# Patient Record
Sex: FEMALE | Race: BLACK OR AFRICAN AMERICAN | NOT HISPANIC OR LATINO | Employment: FULL TIME | URBAN - METROPOLITAN AREA
[De-identification: names, ages, dates, MRNs, and addresses within clinical notes are randomized per-mention and may not be internally consistent; named-entity substitution may affect disease eponyms.]

---

## 2018-04-03 ENCOUNTER — OFFICE VISIT (OUTPATIENT)
Dept: OPHTHALMOLOGY | Facility: CLINIC | Age: 63
End: 2018-04-03
Payer: COMMERCIAL

## 2018-04-03 DIAGNOSIS — Z13.5 SCREENING FOR GLAUCOMA: ICD-10-CM

## 2018-04-03 DIAGNOSIS — H52.4 ASTIGMATISM WITH PRESBYOPIA, BILATERAL: ICD-10-CM

## 2018-04-03 DIAGNOSIS — H35.033 HYPERTENSIVE RETINOPATHY OF BOTH EYES, GRADE 1: Primary | ICD-10-CM

## 2018-04-03 DIAGNOSIS — H52.203 ASTIGMATISM WITH PRESBYOPIA, BILATERAL: ICD-10-CM

## 2018-04-03 DIAGNOSIS — I10 ESSENTIAL HYPERTENSION: ICD-10-CM

## 2018-04-03 PROCEDURE — 99999 PR PBB SHADOW E&M-EST. PATIENT-LVL I: CPT | Mod: PBBFAC,,, | Performed by: OPTOMETRIST

## 2018-04-03 PROCEDURE — 92014 COMPRE OPH EXAM EST PT 1/>: CPT | Mod: S$GLB,,, | Performed by: OPTOMETRIST

## 2018-04-03 PROCEDURE — 92015 DETERMINE REFRACTIVE STATE: CPT | Mod: S$GLB,,, | Performed by: OPTOMETRIST

## 2018-04-03 NOTE — PROGRESS NOTES
HPI     Eye Exam    Additional comments: Yearly           Comments   Last seen by [facility] on 12/12/16 for yearly eye exam. Patient here today for   yearly eye exam. Patient states since last visit her vision has become   more blurred. Patient states both eyes tear constantly associated with FB   sensation.   1. NSC OU  2. Dry Eyes  HPI    Any vision changes since last exam: Yes  Eye pain: No  Other ocular symptoms: Tearing and FB sensation (uses Clear eyes PRN)    Do you wear currently wear glasses or contacts? Glasses    Interested in contacts today? No    Do you plan on getting new glasses today? If needed         Last edited by Dayanna Benjamin, PCT on 4/3/2018 10:29 AM. (History)            Assessment /Plan     For exam results, see Encounter Report.    Hypertensive retinopathy of both eyes, grade 1    Essential hypertension    Screening for glaucoma    Astigmatism with presbyopia, bilateral      Grade 1 hypertensive retinopathy changes in caliber of the vessels and nicking is apparent.  Glaucoma screening and fundus exam normal.  Updated glasses prescription.  Return to clinic 1 yr.

## 2018-04-09 ENCOUNTER — OFFICE VISIT (OUTPATIENT)
Dept: OTOLARYNGOLOGY | Facility: CLINIC | Age: 63
End: 2018-04-09
Payer: COMMERCIAL

## 2018-04-09 ENCOUNTER — CLINICAL SUPPORT (OUTPATIENT)
Dept: AUDIOLOGY | Facility: CLINIC | Age: 63
End: 2018-04-09
Payer: COMMERCIAL

## 2018-04-09 VITALS
SYSTOLIC BLOOD PRESSURE: 162 MMHG | TEMPERATURE: 98 F | WEIGHT: 175.94 LBS | BODY MASS INDEX: 33.22 KG/M2 | HEART RATE: 73 BPM | HEIGHT: 61 IN | DIASTOLIC BLOOD PRESSURE: 93 MMHG

## 2018-04-09 DIAGNOSIS — L29.9 ITCHING OF EAR: Primary | ICD-10-CM

## 2018-04-09 DIAGNOSIS — H92.03 OTALGIA OF BOTH EARS: Primary | ICD-10-CM

## 2018-04-09 PROCEDURE — 99999 PR PBB SHADOW E&M-EST. PATIENT-LVL III: CPT | Mod: PBBFAC,,, | Performed by: OTOLARYNGOLOGY

## 2018-04-09 PROCEDURE — 99243 OFF/OP CNSLTJ NEW/EST LOW 30: CPT | Mod: S$GLB,,, | Performed by: OTOLARYNGOLOGY

## 2018-04-09 PROCEDURE — 92567 TYMPANOMETRY: CPT | Mod: S$GLB,,, | Performed by: AUDIOLOGIST

## 2018-04-09 PROCEDURE — 92557 COMPREHENSIVE HEARING TEST: CPT | Mod: S$GLB,,, | Performed by: AUDIOLOGIST

## 2018-04-09 RX ORDER — CLOBETASOL PROPIONATE 0.46 MG/ML
SOLUTION TOPICAL 2 TIMES DAILY
Qty: 25 ML | Refills: 3 | Status: SHIPPED | OUTPATIENT
Start: 2018-04-09 | End: 2018-04-19

## 2018-04-09 NOTE — PROGRESS NOTES
Delmer Guardado was seen 04/09/2018 for an audiological evaluation.  Patient complains of itching ears for the past year.  She denies hearing loss, but would like a baseline audiogram today.  She reports occasional bilateral tinnitus.    Results reveal essentiallynormal hearing sensitivity 250-8000 Hz bilaterally.  Speech Reception Thresholds were  15 dBHL for the right ear and 15 dBHL for the left ear.   Word recognition scores were excellent bilaterally.  Tympanograms were Type A for the right ear and Type A for the left ear.    Patient was counseled on the above findings.    REC:  Annual audiogram

## 2018-04-09 NOTE — LETTER
April 23, 2018      Marisol Martin, OD  [address]  [place] [address]           [facility]  [address] [place] [address]  Phone: [phone]  Fax: [phone]          Patient: Delmer Guardado   MR Number: [id number]   YOB: 1955   Date of Visit: 4/9/2018       Dear Dr. Marisol Martin:    Thank you for referring Delmer Guardado to me for evaluation. Attached you will find relevant portions of my assessment and plan of care.    If you have questions, please do not hesitate to call me. I look forward to following Delmer Guardado along with you.    Sincerely,    Baldo Yates MD    Enclosure  CC:  No Recipients    If you would like to receive this communication electronically, please contact [email or web address] or [phone] to request more information on "[employer]" Link access.    For providers and/or their staff who would like to refer a patient to [facility], please contact us through our one-stop-shop provider referral line, [facility] , at [phone].    If you feel you have received this communication in error or would no longer like to receive these types of communications, please e-mail [email or web address]

## 2018-04-23 NOTE — PROGRESS NOTES
"Referring Provider:    Marisol Martin, Od  [address] [place] [address]  Subjective:   Patient: Delmer Guardado [id number], :1955   Visit date:2018 10:50 AM    Chief Complaint:  ear itches (bilateral, left more than right, for yrs)    HPI:  Delmer is a 63 y.o. female who I was asked to see in consultation for evaluation of the following issue(s):     Right ear itching.  Years.  No clear exacerbating or relieving factors.  No drainage or hearing loss.     Review of Systems:  -     Allergic/Immunologic: has No Known Allergies..  -     Constitutional: Current temp: 97.5 °F (36.4 °C) (Tympanic)      Her meds, allergies, medical, surgical, social & family histories were reviewed & updated:  -     She has a current medication list which includes the following prescription(s): olmesartan/amlodipin/hcthiazid, paroxetine, and clobetasol.  -     She  has a past medical history of Hypertension.   -     She  does not have any pertinent problems on file.   -     She  has a past surgical history that includes Shoulder surgery.  -     She  reports that she has been smoking.  She does not have any smokeless tobacco history on file. She reports that she drinks alcohol. She reports that she does not use drugs.  -     Her family history includes Cancer in her sister; Diabetes in her father; Stroke in her father.  -     She has No Known Allergies.    Objective:     Physical Exam:  Vitals:  BP (!) 162/93   Pulse 73   Temp 97.5 °F (36.4 °C) (Tympanic)   Ht 5' 1" (1.549 m)   Wt 79.8 kg (175 lb 14.8 oz)   BMI 33.24 kg/m²   General appearance:  Well developed, well nourished    Eyes:  Extraocular motions intact, PERRL    Communication:  no hoarseness, no dysphonia    Ears:  Otoscopy of external auditory canals and tympanic membranes was normal, clinical speech reception thresholds grossly intact, no mass/lesion of auricle. Somewhat dry, flaky skin of right EAC  Nose:  No masses/lesions of external nose, nasal " mucosa, septum, and turbinates were within normal limits.  Mouth:  No mass/lesion of lips, teeth, gums, hard/soft palate, tongue, tonsils, or oropharynx.    Cardiovascular:  No pedal edema; Radial Pulses +2     Neck & Lymphatics:  No cervical lymphadenopathy, no neck mass/crepitus/ asymmetry, trachea is midline, no thyroid enlargement/tenderness/mass.    Psych: Oriented x3,  Alert with normal mood and affect.     Respiration/Chest:  Symmetric expansion during respiration, normal respiratory effort.    Skin:  Warm and intact. No ulcerations of face, scalp, neck.      Assessment & Plan:   Itching of ear    Other orders  -     clobetasol (TEMOVATE) 0.05 % external solution; Apply topically 2 (two) times daily.  Dispense: 25 mL; Refill: 3        We discussed her medical conditions, treatments and plan.  Delmer should return to clinic if any issues arise (symptoms worsen or persist), otherwise we will see her back in the clinic only as needed.      Thank you for allowing me to participate in the care of Delmer.    Baldo Yates MD

## 2018-09-11 ENCOUNTER — HISTORICAL (OUTPATIENT)
Dept: ADMINISTRATIVE | Facility: HOSPITAL | Age: 63
End: 2018-09-11

## 2019-04-08 ENCOUNTER — OFFICE VISIT (OUTPATIENT)
Dept: OPHTHALMOLOGY | Facility: CLINIC | Age: 64
End: 2019-04-08
Payer: COMMERCIAL

## 2019-04-08 DIAGNOSIS — Z13.5 SCREENING FOR GLAUCOMA: ICD-10-CM

## 2019-04-08 DIAGNOSIS — H52.203 ASTIGMATISM WITH PRESBYOPIA, BILATERAL: ICD-10-CM

## 2019-04-08 DIAGNOSIS — H52.4 ASTIGMATISM WITH PRESBYOPIA, BILATERAL: ICD-10-CM

## 2019-04-08 DIAGNOSIS — H25.13 NUCLEAR SCLEROSIS OF BOTH EYES: ICD-10-CM

## 2019-04-08 DIAGNOSIS — H35.033 HYPERTENSIVE RETINOPATHY OF BOTH EYES, GRADE 1: Primary | ICD-10-CM

## 2019-04-08 PROCEDURE — 92015 DETERMINE REFRACTIVE STATE: CPT | Mod: S$GLB,,, | Performed by: OPTOMETRIST

## 2019-04-08 PROCEDURE — 92015 PR REFRACTION: ICD-10-PCS | Mod: S$GLB,,, | Performed by: OPTOMETRIST

## 2019-04-08 PROCEDURE — 99999 PR PBB SHADOW E&M-EST. PATIENT-LVL II: ICD-10-PCS | Mod: PBBFAC,,, | Performed by: OPTOMETRIST

## 2019-04-08 PROCEDURE — 92014 PR EYE EXAM, EST PATIENT,COMPREHESV: ICD-10-PCS | Mod: S$GLB,,, | Performed by: OPTOMETRIST

## 2019-04-08 PROCEDURE — 99999 PR PBB SHADOW E&M-EST. PATIENT-LVL II: CPT | Mod: PBBFAC,,, | Performed by: OPTOMETRIST

## 2019-04-08 PROCEDURE — 92014 COMPRE OPH EXAM EST PT 1/>: CPT | Mod: S$GLB,,, | Performed by: OPTOMETRIST

## 2019-04-08 RX ORDER — SERTRALINE HYDROCHLORIDE 25 MG/1
TABLET, FILM COATED ORAL
Refills: 0 | COMMUNITY
Start: 2019-03-27 | End: 2023-09-25

## 2019-04-08 NOTE — PROGRESS NOTES
HPI     Hypertensive Eye Exam      Additional comments: Yearly              Comments     Last seen by [facility] on 4/3/18 for yearly eye exam  Patient here today for yearly eye exam  Has noticeable changes in vision since last eye exam  Wears Bifocal glasses full-time updated 1 year ago  Patient states vision has been blurred at distance and near for a few   months  States she has anxiety and when she gets nervous her vision is blurred   unsure if medication is causing that (Sertraline 25mg)  No other complaints  Uses lubricating drops OTC for dryness    1. Hypertensive retinopathy OU grade 1  2. NSC OU  3. Dry Eyes OU          Last edited by Dayanna Benjamin, PCT on 4/8/2019 10:07 AM. (History)            Assessment /Plan     For exam results, see Encounter Report.    Hypertensive retinopathy of both eyes, grade 1    Screening for glaucoma    Nuclear sclerosis of both eyes    Astigmatism with presbyopia, bilateral      Glaucoma screening negative.  Early hypertensive retinopathy both eyes.  Nuclear sclerosis may affect vision in low light.  Updated glasses prescription.  Return to clinic 1 yr.

## 2022-04-07 ENCOUNTER — HISTORICAL (OUTPATIENT)
Dept: ADMINISTRATIVE | Facility: HOSPITAL | Age: 67
End: 2022-04-07
Payer: COMMERCIAL

## 2022-04-23 VITALS
HEIGHT: 61 IN | WEIGHT: 163 LBS | BODY MASS INDEX: 30.78 KG/M2 | SYSTOLIC BLOOD PRESSURE: 149 MMHG | DIASTOLIC BLOOD PRESSURE: 54 MMHG

## 2022-05-02 NOTE — HISTORICAL [facility]
This is a historical note converted from Nikole. Formatting and pictures may have been removed.  Please reference Nikole for original formatting and attached multimedia. Chief Complaint  PT. REPORTS THAT SHE HAS BEEN HAVING RIGHT KNEE PAIN SINCE 6/2018. PT. DID MRI IN [place]. CD BROUGHT IN TODAY. PAIN TODAY 5/10....SB  History of Present Illness  This is a 63-year-old female comes in today with continued?right knee pain she has had since this summer.? She states this past summer when she was?cleaning out her house that she slipped?and then felt a pop in her knee and had a lot of pain since that point time.? She initially?had a lot of pain and swelling?and has had continued symptoms in the posterior medial aspect of the knee since that incident. ?She has an MRI that shows a?large medial meniscus posterior?horn tear.  Review of Systems  GENERAL: No fevers, chills, unexpected weight loss or gain  MUSCULOSKELETAL: Joint pain, extremity pain  Physical Exam  Vitals & Measurements  HR:?78(Peripheral)? BP:?149/54?  HT:?156?cm? HT:?156?cm? WT:?73.93?kg? WT:?73.93?kg? BMI:?30.38?  General: Well-developed, well-nourished.  Neuro: Alert and oriented x 3.  Psych: Normal mood and affect.  CV: Palpable radial pulses.  Resp: Smooth and unlabored.  Skin: No evidence of focal lesions or trauma.  Hem/Imm/Lymph: No evidence of lymphangitis or adenopathy.  Gait: No trendelenburg gait.  DTR: 2+, no hypo or hyperreflexia.  Coordination and Balance: No tremors or abnormal station.  Knee Exam:  No obvious deformity. Range of motion from  degrees. Negative patella grind and equal subluxation of knee cap medial and lateral < 1cm. Negative patella tendon tenderness. Negative Lachman and anterior drawer test. Negative posterior drawer test. Negative varus and valgus stress test.? Positive medial joint line tenderness.? Positive McMurrays medial sided knee pain.? Positive deep knee flexion been test with medial sided knee pain.? Negative  lateral joint line tenderness. 4-/5 strength and normal skin appearance. Sensibility normal.  Assessment/Plan  1.?Tear of medial meniscus of right knee  ? We discussed multiple options we will start conservatively. ?She will get a steroid injection today. ?I told her that she may indeed undergo surgery?in the future.  Knee injection:  The lateral parapatellar site was prepped and draped in normal sterile fashion. A 25-gauge needle was inserted into that space.? A solution with 1cc of 40mg depomedrol and 2cc of 0.5% Marcaine was injected into the knee. Patient tolerated procedure well without any complications.  Ordered:  methylPREDNISolone, 40 mg, Intra-Articular, Once, first dose 09/11/18 13:25:00 CDT, stop date 09/11/18 13:25:00 CDT, NOW  asp/inj jnt/bursa, major [id number] PC, 09/11/18 13:25:00 CDT, [facility] - Ortho Surg , Routine, 09/11/18 13:25:00 CDT  Office/Outpatient Visit Level 3 [facility] [id number] PC, Tear of medial meniscus of right knee, [facility] - Ortho Surg , 09/11/18 13:25:00 CDT  ?   Problem List/Past Medical History  Ongoing  Obesity  Historical  No qualifying data  Medications  amlodipine/hydrochlorothiazide/olmesartan 5 mg-25 mg-40 mg oral tablet, 1 tab(s), Oral, Daily  Depo-Medrol 40 mg/mL injectable suspension, 40 mg, Intra-Articular, Once  GABAPENTIN 300MG CAPSULES, 300 mg= 1 cap(s), Oral, TID  NAPROXEN 500MG TABLETS, 500 mg= 1 tab(s), Oral, BID  OLMESARTAN/AMLOD/HCTZ 40-5-25MG T, 1 tab(s), Oral, Daily  POTASSIUM CL 20MEQ ER TABLETS, 20 mEq= 1 tab(s), Oral, BID  PREDNISONE 20MG TABLETS, 20 mg= 1 tab(s), Oral, BID  TRAMADOL 50MG TABLETS  TRINTELLIX 10MG TB (WAS BRINTELLIX), 10 mg= 1 tab(s), Oral, Daily  Allergies  No Known Medication Allergies  Social History  Alcohol  Current, Beer, 09/11/2018  Substance Abuse  Never, 09/11/2018  Tobacco  Current every day smoker Use:., 09/11/2018  Health Maintenance  Health Maintenance  ???Pending?(in the next year)  ??? ??Due?  ??? ? ? ?ADL Screening due??09/11/18??and  every 1??year(s)  ??? ? ? ?Alcohol Misuse Screening due??09/11/18??and every 1??year(s)  ??? ? ? ?Aspirin Therapy for CVD Prevention due??09/11/18??and every 1??year(s)  ??? ? ? ?Smoking Cessation due??09/11/18??Variable frequency  ??? ? ? ?Tetanus Vaccine due??09/11/18??and every 10??year(s)  ??? ? ? ?Zoster Vaccine due??09/11/18??and every 100??year(s)  ???Satisfied?(in the past 1 year)  ??? ??Satisfied?  ??? ? ? ?Blood Pressure Screening on??09/11/18.??Satisfied by Natalie Guzman.  ??? ? ? ?Body Mass Index Check on??09/11/18.??Satisfied by Natalie Gzuman E.  ??? ? ? ?Depression Screening on??09/11/18.??Satisfied by Natalie Guzman E.  ??? ? ? ?Obesity Screening on??09/11/18.??Satisfied by Natalie Guzman E.  ?  ?

## 2023-07-21 ENCOUNTER — HOSPITAL ENCOUNTER (OUTPATIENT)
Dept: RADIOLOGY | Facility: CLINIC | Age: 68
Discharge: HOME | End: 2023-07-21
Attending: ORTHOPAEDIC SURGERY
Payer: MEDICARE

## 2023-07-21 ENCOUNTER — OFFICE VISIT (OUTPATIENT)
Dept: ORTHOPEDICS | Facility: CLINIC | Age: 68
End: 2023-07-21
Payer: MEDICARE

## 2023-07-21 ENCOUNTER — PATIENT MESSAGE (OUTPATIENT)
Dept: ORTHOPEDICS | Facility: CLINIC | Age: 68
End: 2023-07-21

## 2023-07-21 VITALS — BODY MASS INDEX: 30.74 KG/M2 | WEIGHT: 162.81 LBS | HEIGHT: 61 IN

## 2023-07-21 DIAGNOSIS — M54.9 BACK PAIN, UNSPECIFIED BACK LOCATION, UNSPECIFIED BACK PAIN LATERALITY, UNSPECIFIED CHRONICITY: ICD-10-CM

## 2023-07-21 DIAGNOSIS — M54.2 NECK PAIN: ICD-10-CM

## 2023-07-21 DIAGNOSIS — M54.12 CERVICAL RADICULOPATHY: Primary | ICD-10-CM

## 2023-07-21 PROCEDURE — 99204 PR OFFICE/OUTPT VISIT, NEW, LEVL IV, 45-59 MIN: ICD-10-PCS | Mod: ,,, | Performed by: ORTHOPAEDIC SURGERY

## 2023-07-21 PROCEDURE — 1101F PT FALLS ASSESS-DOCD LE1/YR: CPT | Mod: CPTII,,, | Performed by: ORTHOPAEDIC SURGERY

## 2023-07-21 PROCEDURE — 1160F PR REVIEW ALL MEDS BY PRESCRIBER/CLIN PHARMACIST DOCUMENTED: ICD-10-PCS | Mod: CPTII,,, | Performed by: ORTHOPAEDIC SURGERY

## 2023-07-21 PROCEDURE — 3288F PR FALLS RISK ASSESSMENT DOCUMENTED: ICD-10-PCS | Mod: CPTII,,, | Performed by: ORTHOPAEDIC SURGERY

## 2023-07-21 PROCEDURE — 1159F PR MEDICATION LIST DOCUMENTED IN MEDICAL RECORD: ICD-10-PCS | Mod: CPTII,,, | Performed by: ORTHOPAEDIC SURGERY

## 2023-07-21 PROCEDURE — 72100 XR LUMBAR SPINE AP AND LATERAL: ICD-10-PCS | Mod: ,,, | Performed by: ORTHOPAEDIC SURGERY

## 2023-07-21 PROCEDURE — 99204 OFFICE O/P NEW MOD 45 MIN: CPT | Mod: ,,, | Performed by: ORTHOPAEDIC SURGERY

## 2023-07-21 PROCEDURE — 1159F MED LIST DOCD IN RCRD: CPT | Mod: CPTII,,, | Performed by: ORTHOPAEDIC SURGERY

## 2023-07-21 PROCEDURE — 4010F ACE/ARB THERAPY RXD/TAKEN: CPT | Mod: CPTII,,, | Performed by: ORTHOPAEDIC SURGERY

## 2023-07-21 PROCEDURE — 72040 XR CERVICAL SPINE AP LATERAL: ICD-10-PCS | Mod: ,,, | Performed by: ORTHOPAEDIC SURGERY

## 2023-07-21 PROCEDURE — 1125F PR PAIN SEVERITY QUANTIFIED, PAIN PRESENT: ICD-10-PCS | Mod: CPTII,,, | Performed by: ORTHOPAEDIC SURGERY

## 2023-07-21 PROCEDURE — 3008F BODY MASS INDEX DOCD: CPT | Mod: CPTII,,, | Performed by: ORTHOPAEDIC SURGERY

## 2023-07-21 PROCEDURE — 1125F AMNT PAIN NOTED PAIN PRSNT: CPT | Mod: CPTII,,, | Performed by: ORTHOPAEDIC SURGERY

## 2023-07-21 PROCEDURE — 1160F RVW MEDS BY RX/DR IN RCRD: CPT | Mod: CPTII,,, | Performed by: ORTHOPAEDIC SURGERY

## 2023-07-21 PROCEDURE — 3008F PR BODY MASS INDEX (BMI) DOCUMENTED: ICD-10-PCS | Mod: CPTII,,, | Performed by: ORTHOPAEDIC SURGERY

## 2023-07-21 PROCEDURE — 72040 X-RAY EXAM NECK SPINE 2-3 VW: CPT | Mod: ,,, | Performed by: ORTHOPAEDIC SURGERY

## 2023-07-21 PROCEDURE — 1101F PR PT FALLS ASSESS DOC 0-1 FALLS W/OUT INJ PAST YR: ICD-10-PCS | Mod: CPTII,,, | Performed by: ORTHOPAEDIC SURGERY

## 2023-07-21 PROCEDURE — 72100 X-RAY EXAM L-S SPINE 2/3 VWS: CPT | Mod: ,,, | Performed by: ORTHOPAEDIC SURGERY

## 2023-07-21 PROCEDURE — 4010F PR ACE/ARB THEARPY RXD/TAKEN: ICD-10-PCS | Mod: CPTII,,, | Performed by: ORTHOPAEDIC SURGERY

## 2023-07-21 PROCEDURE — 3288F FALL RISK ASSESSMENT DOCD: CPT | Mod: CPTII,,, | Performed by: ORTHOPAEDIC SURGERY

## 2023-07-21 RX ORDER — HYDROCHLOROTHIAZIDE 25 MG/1
25 TABLET ORAL DAILY
COMMUNITY

## 2023-07-21 RX ORDER — ALPRAZOLAM 0.5 MG/1
0.5 TABLET ORAL DAILY PRN
COMMUNITY
Start: 2023-06-28

## 2023-07-21 RX ORDER — ROSUVASTATIN CALCIUM 10 MG/1
10 TABLET, COATED ORAL NIGHTLY
COMMUNITY
Start: 2023-06-19

## 2023-07-21 RX ORDER — AMLODIPINE BESYLATE 5 MG/1
5 TABLET ORAL
COMMUNITY
Start: 2023-04-17

## 2023-07-21 RX ORDER — OLMESARTAN MEDOXOMIL 40 MG/1
40 TABLET ORAL
COMMUNITY
Start: 2023-04-17 | End: 2023-09-25

## 2023-07-21 NOTE — PROGRESS NOTES
[facility]  Orthopedic Clinic Note      Chief Complaint:   Chief Complaint   Patient presents with    Pain     upper back and neck pain. states it has been ongoing for a few years. states it has been constant. at this time states its not bad  states burning and at times it will radiate down. ibu if needed. when she turns to the sides states she hears popping. hx of a fall in 2015     Referring Physician: No ref. provider found      History of Present Illness:    This is a 68 y.o. year old female presenting with complaints of neck and low back pain ongoing for several years.  She reports mild midline neck/upper back pain, but her main complaint is radicular symptoms in the bilateral upper extremities, left worse than right.  There is burning pain in the bilateral trapezial area with symptoms that extend into the proximal upper extremities.  The symptoms in the left upper extremity actually radiate into the forearm and hand.  There is associated numbness and tingling in the left upper extremity and some perceived weakness.  She has been taking over the counter Tylenol and anti-inflammatories with no improvement in her symptoms  She also complains of low back pain ongoing for several years.  Pain is located in the midline lower back with associated radicular symptoms in the left lower extremity only.  She reports pain radiating into the buttock and down the left lower extremity.  She denies any significant weakness in the left lower extremity.  Taking over the counter Tylenol and anti-inflammatories with no improvement in her symptoms.      Past Medical History:   Diagnosis Date    Hypertension        Past Surgical History:   Procedure Laterality Date    SHOULDER SURGERY         Current Outpatient Medications   Medication Sig    ALPRAZolam (XANAX) 0.5 MG tablet Take 0.5 mg by mouth daily as needed.    amLODIPine (NORVASC) 5 MG tablet Take 5 mg by mouth.    hydroCHLOROthiazide (HYDRODIURIL) 25 MG tablet Take  "25 mg by mouth once daily.    olmesartan (BENICAR) 40 MG tablet Take 40 mg by mouth.    OLMESARTAN/AMLODIPIN/HCTHIAZID (TRIBENZOR ORAL) Take by mouth.    paroxetine (PAXIL) 40 MG tablet Take 40 mg by mouth every morning.    rosuvastatin (CRESTOR) 10 MG tablet Take 10 mg by mouth every evening.    sertraline (ZOLOFT) 25 MG tablet TK 1  PO Q MORNING    clobetasol (TEMOVATE) 0.05 % external solution Apply topically 2 (two) times daily.     No current facility-administered medications for this visit.       Review of patient's allergies indicates:  No Known Allergies    Family History   Problem Relation Age of Onset    Diabetes Father     Stroke Father     Cancer Sister         Ear cancer       Social History     Socioeconomic History    Marital status:    Tobacco Use    Smoking status: Heavy Smoker   Substance and Sexual Activity    Alcohol use: Yes    Drug use: No    Sexual activity: Yes     Partners: Male     Birth control/protection: Post-menopausal           Review of Systems:  All review of systems negative except for those stated in the HPI.    Examination:    Vital Signs:    Vitals:    07/21/23 1007   Weight: 73.8 kg (162 lb 12.8 oz)   Height: 5' 1" (1.549 m)   PainSc:   7       Body mass index is 30.76 kg/m².    Physical Examination:  General: Well-developed, well-nourished.  Neuro: Alert and oriented x 3.  Psych: Normal mood and affect.  Card: Regular rate and rhythm  Resp: Respirations regular and unlabored  Neck Exam:  No deformity. No tenderness to palpation along the spine. No step off. 5/5 strength RUE. 4/5 strength LUE.  Positive Spurling maneuver. Normal skin appearance. Sensibility normal.  Lumbar Exam:  No deformity. Negative tenderness to palpation along the spine. No step off. 5/5 strength RLE. 5/5 strength LLE.  Positive straight leg raise. Normal skin appearance. Sensibility normal.      Imaging: X-rays ordered and images interpreted today personally by me of 2 views of the cervical spine " demonstrate diffuse degenerative changes.  X-rays ordered and images interpreted today personally by me of 2 views of the lumbar spine demonstrate diffuse degenerative changes.    Assessment: Cervical radiculopathy  -     MRI Cervical Spine Without Contrast; Future; Expected date: 07/21/2023    Neck pain  -     X-Ray Cervical Spine AP And Lateral; Future; Expected date: 07/21/2023    Back pain, unspecified back location, unspecified back pain laterality, unspecified chronicity  -     X-Ray Lumbar Spine AP And Lateral; Future; Expected date: 07/21/2023        Plan:  X-rays were reviewed with the patient.  Given the chronic nature of her symptoms and failure to respond to conservative treatments, order entered for MRI of the cervical spine to assess for possible stenosis.  Plan to refer her for formal physical therapy when she finds a clinic close to home.  Continue over-the-counter medications as needed for pain.  She will return to clinic for review of MRI results once imaging is obtained.  She verbalized understanding of the plan of care with no further questions.    Yao Guardado MD personally performed the services described in this documentation, including but not limited to patient's history, physical examination, and assessment and plan of care. All medical record entries made by SHANIQUA Armas were performed at his direction and in his presence. The medical record was reviewed and is accurate and complete.         Follow up for Review of cervical spine MRI results.      DISCLAIMER: This note may have been dictated using voice recognition software and may contain grammatical errors.     NOTE: Consult report sent to referring provider via [email or web address].

## 2023-09-20 ENCOUNTER — TELEPHONE (OUTPATIENT)
Dept: NEUROSURGERY | Facility: CLINIC | Age: 68
End: 2023-09-20
Payer: MEDICARE

## 2023-09-20 DIAGNOSIS — M47.22 CERVICAL SPONDYLOSIS WITH RADICULOPATHY: Primary | ICD-10-CM

## 2023-09-20 NOTE — TELEPHONE ENCOUNTER
I scheduled patient accordingly per Katherine's recommendation with XR prior at [facility].

## 2023-09-20 NOTE — TELEPHONE ENCOUNTER
Dr. Guardado contacted Dr. Guerra about seeing his mother for her cervical spine.  I spoke to her yesterday late afternoon and this morning.  Please put her on Dr. Guerra scheduled for Monday at 2:30.  She is aware she will have to go get x-rays prior to that appointment.  I requested she arrived at [facility] for 1:30.  Please schedule the x-rays for that time.  I will put the order in now.  You do not have the collar.  She is aware.

## 2023-09-25 ENCOUNTER — HOSPITAL ENCOUNTER (OUTPATIENT)
Dept: RADIOLOGY | Facility: HOSPITAL | Age: 68
Discharge: HOME | End: 2023-09-25
Attending: PHYSICIAN ASSISTANT
Payer: MEDICARE

## 2023-09-25 ENCOUNTER — OFFICE VISIT (OUTPATIENT)
Dept: NEUROSURGERY | Facility: CLINIC | Age: 68
End: 2023-09-25
Payer: MEDICARE

## 2023-09-25 VITALS
DIASTOLIC BLOOD PRESSURE: 7 MMHG | RESPIRATION RATE: 16 BRPM | HEART RATE: 75 BPM | HEIGHT: 61 IN | WEIGHT: 155 LBS | SYSTOLIC BLOOD PRESSURE: 144 MMHG | BODY MASS INDEX: 29.27 KG/M2

## 2023-09-25 DIAGNOSIS — M47.22 CERVICAL SPONDYLOSIS WITH RADICULOPATHY: ICD-10-CM

## 2023-09-25 DIAGNOSIS — M47.22 CERVICAL SPONDYLOSIS WITH RADICULOPATHY: Primary | ICD-10-CM

## 2023-09-25 PROCEDURE — 1159F MED LIST DOCD IN RCRD: CPT | Mod: CPTII,,, | Performed by: NEUROLOGICAL SURGERY

## 2023-09-25 PROCEDURE — 3077F SYST BP >= 140 MM HG: CPT | Mod: CPTII,,, | Performed by: NEUROLOGICAL SURGERY

## 2023-09-25 PROCEDURE — 3008F BODY MASS INDEX DOCD: CPT | Mod: CPTII,,, | Performed by: NEUROLOGICAL SURGERY

## 2023-09-25 PROCEDURE — 1101F PT FALLS ASSESS-DOCD LE1/YR: CPT | Mod: CPTII,,, | Performed by: NEUROLOGICAL SURGERY

## 2023-09-25 PROCEDURE — 3078F DIAST BP <80 MM HG: CPT | Mod: CPTII,,, | Performed by: NEUROLOGICAL SURGERY

## 2023-09-25 PROCEDURE — 1125F AMNT PAIN NOTED PAIN PRSNT: CPT | Mod: CPTII,,, | Performed by: NEUROLOGICAL SURGERY

## 2023-09-25 PROCEDURE — 72040 X-RAY EXAM NECK SPINE 2-3 VW: CPT | Mod: TC

## 2023-09-25 PROCEDURE — 1160F RVW MEDS BY RX/DR IN RCRD: CPT | Mod: CPTII,,, | Performed by: NEUROLOGICAL SURGERY

## 2023-09-25 PROCEDURE — 3288F FALL RISK ASSESSMENT DOCD: CPT | Mod: CPTII,,, | Performed by: NEUROLOGICAL SURGERY

## 2023-09-25 PROCEDURE — 99204 OFFICE O/P NEW MOD 45 MIN: CPT | Mod: ,,, | Performed by: NEUROLOGICAL SURGERY

## 2023-09-25 RX ORDER — SERTRALINE HYDROCHLORIDE 100 MG/1
100 TABLET, FILM COATED ORAL 2 TIMES DAILY
COMMUNITY
Start: 2023-09-07

## 2023-09-25 RX ORDER — GABAPENTIN 300 MG/1
CAPSULE ORAL
Qty: 90 CAPSULE | Refills: 2 | Status: SHIPPED | OUTPATIENT
Start: 2023-09-25

## 2023-09-25 RX ORDER — METHOCARBAMOL 500 MG/1
500 TABLET, FILM COATED ORAL 4 TIMES DAILY PRN
Qty: 40 TABLET | Refills: 2 | Status: SHIPPED | OUTPATIENT
Start: 2023-09-25 | End: 2023-10-25

## 2023-09-25 NOTE — PROGRESS NOTES
[facility]  Neurosurgery        Delmer Guardado   [id number]   1955       SUBJECTIVE:     CHIEF COMPLAINT:  Neck and upper extremity pain    HPI:  Delmer Guardado is a 68 y.o. female who presents for neurosurgical evaluation.  She complains of neck pain with pain into bilateral trapezius region, shoulders, and upper arms.  She has pain into the forearm and hand on the left.  She has pain in the left axillary region and down the inside of the left arm to the last two fingers of the hand at times.  She has numbness and tingling in the left arm in the same distribution as her pain.  She has intermittent pain that shoots down the spine to the tailbone.  Onset of symptoms was abrupt, starting 8 years ago after a fall, with gradually worsening course since that time.  She gets slight relief with use of a heating pad and ibuprofen.  Patient denies difficulty with balance or coordination.  She denies disturbances in bowel or bladder function.        Review of patient's allergies indicates:  No Known Allergies    Current Outpatient Medications:     ALPRAZolam (XANAX) 0.5 MG tablet, Take 0.5 mg by mouth daily as needed., Disp: , Rfl:     amLODIPine (NORVASC) 5 MG tablet, Take 5 mg by mouth., Disp: , Rfl:     hydroCHLOROthiazide (HYDRODIURIL) 25 MG tablet, Take 25 mg by mouth once daily., Disp: , Rfl:     OLMESARTAN/AMLODIPIN/HCTHIAZID (TRIBENZOR ORAL), Take by mouth., Disp: , Rfl:     rosuvastatin (CRESTOR) 10 MG tablet, Take 10 mg by mouth every evening., Disp: , Rfl:     sertraline (ZOLOFT) 100 MG tablet, Take 100 mg by mouth 2 (two) times daily., Disp: , Rfl:     clobetasol (TEMOVATE) 0.05 % external solution, Apply topically 2 (two) times daily., Disp: 25 mL, Rfl: 3    gabapentin (NEURONTIN) 300 MG capsule, take 1 capsule at bedtime x 5-7 days, then 1 capsule twice a day x 5-7 days, then 1 capsule three times a day; okay to continue increasing as tolerated by adding one capsule to each dose every 5-7 days, not  to exceed 3600mg/day, Disp: 90 capsule, Rfl: 2    methocarbamoL (ROBAXIN) 500 MG Tab, Take 1 tablet (500 mg total) by mouth 4 (four) times daily as needed (muscle spasms)., Disp: 40 tablet, Rfl: 2   Past Medical History:   Diagnosis Date    Back pain, unspecified back location, unspecified back pain laterality, unspecified chronicity     Cervical radiculopathy     Hypertension      Past Surgical History:   Procedure Laterality Date    SHOULDER SURGERY       Family History   Problem Relation Age of Onset    Diabetes Father     Stroke Father     Cancer Sister         Ear cancer     Social History     Tobacco Use    Smoking status: Heavy Smoker   Substance Use Topics    Alcohol use: Yes    Drug use: No        Review of Systems:    Pertinent items are noted in HPI.        OBJECTIVE:     Vital Signs (Most Recent)  Pulse: 75 (09/25/23 1432)  Resp: 16 (09/25/23 1432)  BP: (!) 144/7 (09/25/23 1432)    Physical Exam:  General:  Pleasant. Well-nourished. Alert. No acute distress.    Head:  Normocephalic, without obvious abnormality, atraumatic    Lungs:  Quiet, non-labored     Neurological:    Oriented to Person, Place, Time   Speech:  normal  Memory, cognition, and affect are appropriate.    Muscle strength against resistance:    Strength  Deltoids Triceps Biceps Wrist Extension Intrinsics ADM   Upper: R 5/5 5/5 5/5 5/5 5/5 5/5    L 5/5 5/5 5/5 5-/5 5/5 5-/5     Atrophy present left FDI    Reflexes:   Right Left   Triceps 2+ 1+   Biceps 2+ 2+   Brachioradialis 2+ 2+   Patellar 2+ 2+     Sensation is decreased to a pinprick in left C5/C6 distribution    Hardy: negative (both)    Gait:  normal      Musculoskeletal:   Cervical ROM: Pain in neck and left arm with left rotation, ROM is full  Spurling's: Positive for Spurling's: left  Spurling's on the right produces contralateral pain  Tinel's is negative Bilateral wrist and positive Left elbows.  Left shoulder rotation negative    MRI of the cervical spine from 07/24/2023  shows some loss of lordosis.  There is moderate spondylosis from C5 through T1 with moderate bilateral foraminal stenosis little worse on the left at C5-6.  It is symmetric and severe bilaterally at C6-7.  There is moderate foraminal stenosis on the left at C7-T1.  Plain x-rays of the cervical spine from earlier today again show significant spondylosis and interspace narrowing at C5-6 and C6-7.  Alignment is normal and there is no abnormal motion with flexion/extension.    ASSESSMENT/PLAN:     1. Cervical spondylosis with radiculopathy  - gabapentin (NEURONTIN) 300 MG capsule; take 1 capsule at bedtime x 5-7 days, then 1 capsule twice a day x 5-7 days, then 1 capsule three times a day; okay to continue increasing as tolerated by adding one capsule to each dose every 5-7 days, not to exceed 3600mg/day  Dispense: 90 capsule; Refill: 2  - methocarbamoL (ROBAXIN) 500 MG Tab; Take 1 tablet (500 mg total) by mouth 4 (four) times daily as needed (muscle spasms).  Dispense: 40 tablet; Refill: 2  - Ambulatory referral/consult to Physical/Occupational Therapy; Future  - Information given on [facility] cervical traction  - Follow up in 3 months         I, Dr. Song Kelly, personally performed the services described in this documentation. All medical record entries made by the scribe, Lizzie Bear RN, were at my direction and in my presence.  I have reviewed the chart and agree that the record reflects my personal performance and is accurate and complete. Song Kelly MD.  12:21 PM 09/25/2023       Song Kelly MD FACS [facility]

## 2024-02-08 ENCOUNTER — TELEPHONE (OUTPATIENT)
Dept: NEUROSURGERY | Facility: CLINIC | Age: 69
End: 2024-02-08
Payer: MEDICARE

## 2025-03-14 DIAGNOSIS — J18.9 WALKING PNEUMONIA: Primary | ICD-10-CM

## 2025-03-14 RX ORDER — AZITHROMYCIN 1 G/1
1 POWDER, FOR SUSPENSION ORAL ONCE
Qty: 1 PACKET | Refills: 0 | Status: SHIPPED | OUTPATIENT
Start: 2025-03-14 | End: 2025-03-14